# Patient Record
Sex: FEMALE | Race: BLACK OR AFRICAN AMERICAN | NOT HISPANIC OR LATINO | URBAN - METROPOLITAN AREA
[De-identification: names, ages, dates, MRNs, and addresses within clinical notes are randomized per-mention and may not be internally consistent; named-entity substitution may affect disease eponyms.]

---

## 2018-10-05 PROBLEM — 62315008 DIARRHEA: Status: ACTIVE | Noted: 2018-10-05

## 2018-10-05 PROBLEM — 275978004 SCREENING FOR MALIGNANT NEOPLASM OF COLON: Status: ACTIVE | Noted: 2018-10-05

## 2022-04-30 ENCOUNTER — TELEPHONE ENCOUNTER (OUTPATIENT)
Dept: URBAN - METROPOLITAN AREA CLINIC 121 | Facility: CLINIC | Age: 62
End: 2022-04-30

## 2022-05-01 ENCOUNTER — TELEPHONE ENCOUNTER (OUTPATIENT)
Dept: URBAN - METROPOLITAN AREA CLINIC 121 | Facility: CLINIC | Age: 62
End: 2022-05-01

## 2022-05-01 RX ORDER — NUT.TX.IMPAIRED DIGESTIVE FXN 0.035-1/ML
DRINK 1 BOTTLE PO QID DURING COLON PREP LIQUID (ML) ORAL
Status: ACTIVE | COMMUNITY
Start: 2018-10-05

## 2022-05-01 RX ORDER — POTASSIUM CHLORIDE 750 MG/1
QD TABLET, FILM COATED, EXTENDED RELEASE ORAL
Status: ACTIVE | COMMUNITY
Start: 2018-10-05

## 2022-05-01 RX ORDER — SODIUM SULFATE, POTASSIUM SULFATE, MAGNESIUM SULFATE 17.5; 3.13; 1.6 G/ML; G/ML; G/ML
MIX AND USE AS IDRECTED SOLUTION, CONCENTRATE ORAL
Status: ACTIVE | COMMUNITY
Start: 2018-10-05

## 2022-05-01 RX ORDER — ETANERCEPT 25 MG
1 PEN IM Q 2WKS KIT SUBCUTANEOUS
Status: ACTIVE | COMMUNITY
Start: 2018-10-05

## 2022-05-01 RX ORDER — VALSARTAN 160 MG/1
TABLET ORAL
Status: ACTIVE | COMMUNITY
Start: 2018-10-05

## 2023-04-03 ENCOUNTER — P2P PATIENT RECORD (OUTPATIENT)
Age: 63
End: 2023-04-03

## 2023-04-06 ENCOUNTER — WEB ENCOUNTER (OUTPATIENT)
Dept: URBAN - METROPOLITAN AREA CLINIC 17 | Facility: CLINIC | Age: 63
End: 2023-04-06

## 2023-04-06 ENCOUNTER — OFFICE VISIT (OUTPATIENT)
Dept: URBAN - METROPOLITAN AREA CLINIC 17 | Facility: CLINIC | Age: 63
End: 2023-04-06
Payer: COMMERCIAL

## 2023-04-06 VITALS
BODY MASS INDEX: 29.26 KG/M2 | SYSTOLIC BLOOD PRESSURE: 176 MMHG | HEART RATE: 113 BPM | TEMPERATURE: 97.1 F | WEIGHT: 171.4 LBS | HEIGHT: 64 IN | DIASTOLIC BLOOD PRESSURE: 83 MMHG

## 2023-04-06 DIAGNOSIS — D12.6 COLON ADENOMA: ICD-10-CM

## 2023-04-06 DIAGNOSIS — M06.9 RHEUMATOID ARTHRITIS, INVOLVING UNSPECIFIED SITE, UNSPECIFIED WHETHER RHEUMATOID FACTOR PRESENT: ICD-10-CM

## 2023-04-06 DIAGNOSIS — R79.89 ELEVATED LFTS: ICD-10-CM

## 2023-04-06 DIAGNOSIS — A09 INFECTIOUS DIARRHEA: ICD-10-CM

## 2023-04-06 DIAGNOSIS — A04.72 CLOSTRIDIUM DIFFICILE COLITIS: ICD-10-CM

## 2023-04-06 DIAGNOSIS — I10 ESSENTIAL HYPERTENSION: ICD-10-CM

## 2023-04-06 PROBLEM — 59621000: Status: ACTIVE | Noted: 2023-04-06

## 2023-04-06 PROBLEM — 69896004: Status: ACTIVE | Noted: 2023-04-06

## 2023-04-06 PROCEDURE — 99204 OFFICE O/P NEW MOD 45 MIN: CPT | Performed by: INTERNAL MEDICINE

## 2023-04-06 RX ORDER — NUT.TX.IMPAIRED DIGESTIVE FXN 0.035-1/ML
DRINK 1 BOTTLE PO QID DURING COLON PREP LIQUID (ML) ORAL
Status: ACTIVE | COMMUNITY
Start: 2018-10-05

## 2023-04-06 RX ORDER — VALSARTAN 160 MG/1
TABLET ORAL
Status: ACTIVE | COMMUNITY
Start: 2018-10-05

## 2023-04-06 RX ORDER — POTASSIUM CHLORIDE 750 MG/1
QD TABLET, FILM COATED, EXTENDED RELEASE ORAL
Status: ACTIVE | COMMUNITY
Start: 2018-10-05

## 2023-04-06 RX ORDER — ETANERCEPT 25 MG
1 PEN IM Q 2WKS KIT SUBCUTANEOUS
Status: ACTIVE | COMMUNITY
Start: 2018-10-05

## 2023-04-06 RX ORDER — SODIUM SULFATE, POTASSIUM SULFATE, MAGNESIUM SULFATE 17.5; 3.13; 1.6 G/ML; G/ML; G/ML
MIX AND USE AS IDRECTED SOLUTION, CONCENTRATE ORAL
Status: ON HOLD | COMMUNITY
Start: 2018-10-05

## 2023-04-06 NOTE — HPI-TODAY'S VISIT:
4/6/23 61 yo lady pt of Oswald García.  seen at [facility] late last month for bloody diarrhea. Treated for c.diff with vanc and florastor CT abd pelvis obtained showed rectal thickening, small HH, non obs renal stone, colon tics, large uterine fibroid, gallstones, LLL nodule.  She has completed 10 days of vancomycin at qid dosing.  Says it did not help. She has already had 4-5 BMs today. No blood in stool. No abdominal pain.  Says stools are greenish brown - mostly liquid. When it is not liquid it is 2-3 little pearls.  No h/o constipation. no more blood in stool Temp goes from 99-100F  She c/o being very weak and unable to eat anything. When she tries to eat, she cant because she is so weak.

## 2023-04-07 ENCOUNTER — OUT OF OFFICE VISIT (OUTPATIENT)
Dept: URBAN - METROPOLITAN AREA MEDICAL CENTER 33 | Facility: MEDICAL CENTER | Age: 63
End: 2023-04-07

## 2023-04-07 ENCOUNTER — CLAIMS CREATED FROM THE CLAIM WINDOW (OUTPATIENT)
Dept: URBAN - METROPOLITAN AREA MEDICAL CENTER 33 | Facility: MEDICAL CENTER | Age: 63
End: 2023-04-07
Payer: COMMERCIAL

## 2023-04-07 DIAGNOSIS — R74.01 ABNORMAL/ELEVATED TRANSAMINASE (SGOT, AMINOTRANSFERASE): ICD-10-CM

## 2023-04-07 DIAGNOSIS — R19.7 ACUTE DIARRHEA: ICD-10-CM

## 2023-04-07 DIAGNOSIS — E87.1 ACUTE HYPONATREMIA: ICD-10-CM

## 2023-04-07 DIAGNOSIS — A04.72 C. DIFFICILE: ICD-10-CM

## 2023-04-07 PROCEDURE — G8427 DOCREV CUR MEDS BY ELIG CLIN: HCPCS | Performed by: INTERNAL MEDICINE

## 2023-04-07 PROCEDURE — 99222 1ST HOSP IP/OBS MODERATE 55: CPT | Performed by: INTERNAL MEDICINE

## 2023-04-07 PROCEDURE — 99232 SBSQ HOSP IP/OBS MODERATE 35: CPT | Performed by: INTERNAL MEDICINE

## 2023-07-19 ENCOUNTER — DASHBOARD ENCOUNTERS (OUTPATIENT)
Age: 63
End: 2023-07-19

## 2023-08-14 ENCOUNTER — OFFICE VISIT (OUTPATIENT)
Dept: URBAN - METROPOLITAN AREA CLINIC 17 | Facility: CLINIC | Age: 63
End: 2023-08-14

## 2023-08-14 RX ORDER — POTASSIUM CHLORIDE 750 MG/1
QD TABLET, FILM COATED, EXTENDED RELEASE ORAL
Status: ACTIVE | COMMUNITY
Start: 2018-10-05

## 2023-08-14 RX ORDER — NUT.TX.IMPAIRED DIGESTIVE FXN 0.035-1/ML
DRINK 1 BOTTLE PO QID DURING COLON PREP LIQUID (ML) ORAL
Status: ACTIVE | COMMUNITY
Start: 2018-10-05

## 2023-08-14 RX ORDER — VALSARTAN 160 MG/1
TABLET ORAL
Status: ACTIVE | COMMUNITY
Start: 2018-10-05

## 2023-08-14 RX ORDER — ETANERCEPT 25 MG
1 PEN IM Q 2WKS KIT SUBCUTANEOUS
Status: ACTIVE | COMMUNITY
Start: 2018-10-05